# Patient Record
Sex: FEMALE | Race: WHITE | Employment: FULL TIME | URBAN - NONMETROPOLITAN AREA
[De-identification: names, ages, dates, MRNs, and addresses within clinical notes are randomized per-mention and may not be internally consistent; named-entity substitution may affect disease eponyms.]

---

## 2022-08-16 ENCOUNTER — OFFICE VISIT (OUTPATIENT)
Dept: OTOLARYNGOLOGY | Age: 32
End: 2022-08-16
Payer: COMMERCIAL

## 2022-08-16 VITALS
WEIGHT: 129.6 LBS | BODY MASS INDEX: 19.64 KG/M2 | HEIGHT: 68 IN | OXYGEN SATURATION: 100 % | DIASTOLIC BLOOD PRESSURE: 62 MMHG | RESPIRATION RATE: 14 BRPM | SYSTOLIC BLOOD PRESSURE: 102 MMHG | HEART RATE: 72 BPM

## 2022-08-16 DIAGNOSIS — J34.2 DEVIATED SEPTUM: ICD-10-CM

## 2022-08-16 DIAGNOSIS — R09.81 NASAL CONGESTION: Primary | ICD-10-CM

## 2022-08-16 PROCEDURE — 99204 OFFICE O/P NEW MOD 45 MIN: CPT | Performed by: OTOLARYNGOLOGY

## 2022-08-16 PROCEDURE — 31231 NASAL ENDOSCOPY DX: CPT | Performed by: OTOLARYNGOLOGY

## 2022-08-16 NOTE — PROGRESS NOTES
response is symmetric. No nystagmus is found. Nose:   The external nasal contour is normal  The nasal mucosa has a normal appearance. The nasal septum is straight. The turbinates have a normal appearance  The nares are patent without evidence of polyposis   Oral cavity:   The dentition is healthy. The oral mucosa is without lesions;  the tongue is symmetric with full mobility and is without fasciculation. The soft palate is symmetric. The oropharynx is unremarkable. Neck: The neck has a normal contour; no masses are found on palpation    Fiberoptic examination of the upper airway using scope was conducted after first applying topical phenylephrine (1%) and lidocaine (4%) to the bilateral nasal cavity by spray. The endoscope was inserted and advanced through the bilateral side of the nose examining the mucosa and nasal structures. Right:  Inferior turbinate enlarged, middle meatus clear, no polyps or purulence, excess cloudy mucus  Left:  Inferior turbinate normal, middle meatus clear, no polyps or purulence  Nasopharynx clear, ET patent, adenoid small. Septum to the right anteriorly. An electronic signature was used to authenticate this note.     --Navi Sue MD     8/16/2022 8:04 AM EDT